# Patient Record
(demographics unavailable — no encounter records)

---

## 2024-12-01 NOTE — HISTORY OF PRESENT ILLNESS
[Neck] : neck [Lower back] : lower back [Gradual] : gradual [2] : 2 [3] : 3 [Dull/Aching] : dull/aching [Localized] : localized [Intermittent] : intermittent [Leisure] : leisure [Rest] : rest [Walking/activity] : walking/activity [Sitting] : sitting [Full time] : Work status: full time [de-identified] : 11/27/2024 - 46 Y M presenting for initial evaluation complaining of 2-3 months of tailbone pain without trauma or injury. Longer standing chronic neck pain for approximately 2 years, aggravated symptoms more recently regarding posterior neck pain. No radicular arm or leg pain. Takes otc NSAID on occasion for symptom relief. He is performing direct yoga exercises for low back pain. 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 No general medical health abnormalities are reported. He is working full time.  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

## 2024-12-01 NOTE — HISTORY OF PRESENT ILLNESS
[Neck] : neck [Lower back] : lower back [Gradual] : gradual [2] : 2 [3] : 3 [Dull/Aching] : dull/aching [Localized] : localized [Intermittent] : intermittent [Leisure] : leisure [Rest] : rest [Walking/activity] : walking/activity [Sitting] : sitting [Full time] : Work status: full time [de-identified] : 11/27/2024 - 46 Y M presenting for initial evaluation complaining of 2-3 months of tailbone pain without trauma or injury. Longer standing chronic neck pain for approximately 2 years, aggravated symptoms more recently regarding posterior neck pain. No radicular arm or leg pain. Takes otc NSAID on occasion for symptom relief. He is performing direct yoga exercises for low back pain. 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 No general medical health abnormalities are reported. He is working full time.  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

## 2024-12-01 NOTE — DISCUSSION/SUMMARY
[de-identified] : 46 Y M with coccydynia  XR today appears to be normal. Patient will initiate use with tailbone cushion and continue NSAID as needed during heightened severity of pain. Discussed we can always order an MRI if his symptoms remain unchanged in 3 months. Patient resides in [place] in the wintertime and will follow up in the spring if pain is unimproved.   Prior to appointment and during encounter with patient extensive medical records were reviewed including but not limited to, hospital records, outpatient records, imaging results, and lab data. During this appointment the patient was examined, diagnoses were discussed and explained in a face to face manner. In addition extensive time was spent reviewing aforementioned diagnostic studies. Counseling including abnormal image results, differential diagnoses, treatment options, risk and benefits, lifestyle changes, current condition, and current medications was performed. Patient's comments, questions, and concerns were addressed and patient verbalized understanding. Based on this patient's presentation at our office, which is an orthopedic spine surgeon's office, this patient inherently / intrinsically has a risk, however minute, of developing issues such as Cauda equina syndrome, bowel and bladder changes, or progression of motor or neurological deficits such as paralysis which may be permanent.  SUZANNE MURRAY Acting as a Scribe for Dr. Sudeep OBREGON, Suzanne Murray, attest that this documentation has been prepared under the direction and in the presence of Provider Lj Sheets MD.

## 2024-12-01 NOTE — PHYSICAL EXAM
[Normal Coordination] : normal coordination [Normal DTR UE/LE] : normal DTR UE/LE  [Normal Sensation] : normal sensation [Normal Mood and Affect] : normal mood and affect [Oriented] : oriented [Able to Communicate] : able to communicate [Normal Skin] : normal skin [No Rash] : no rash [No Ulcers] : no ulcers [No Lesions] : no lesions [No obvious lymphadenopathy in areas examined] : no obvious lymphadenopathy in areas examined [Well Developed] : well developed [Peripheral vascular exam is grossly normal] : peripheral vascular exam is grossly normal [No Respiratory Distress] : no respiratory distress [Lungs clear to auscultation bilaterally] : lungs clear to auscultation bilaterally [Normal Bowel Sounds] : normal bowel sounds [Non-Tender] : non-tender [No HSM] : no HSM [No Mass] : no mass [NL (90)] : forward flexion 90 degrees [NL (30)] : right lateral rotation 30 degrees [NL (45)] : extension 45 degrees [NL (40)] : right lateral bending 40 degrees [5___] : right extensor hallicus longus 5[unfilled]/5 [] : clonus not sustained at ankle

## 2024-12-01 NOTE — DISCUSSION/SUMMARY
[de-identified] : 46 Y M with coccydynia  XR today appears to be normal. Patient will initiate use with tailbone cushion and continue NSAID as needed during heightened severity of pain. Discussed we can always order an MRI if his symptoms remain unchanged in 3 months. Patient resides in [place] in the wintertime and will follow up in the spring if pain is unimproved.   Prior to appointment and during encounter with patient extensive medical records were reviewed including but not limited to, hospital records, outpatient records, imaging results, and lab data. During this appointment the patient was examined, diagnoses were discussed and explained in a face to face manner. In addition extensive time was spent reviewing aforementioned diagnostic studies. Counseling including abnormal image results, differential diagnoses, treatment options, risk and benefits, lifestyle changes, current condition, and current medications was performed. Patient's comments, questions, and concerns were addressed and patient verbalized understanding. Based on this patient's presentation at our office, which is an orthopedic spine surgeon's office, this patient inherently / intrinsically has a risk, however minute, of developing issues such as Cauda equina syndrome, bowel and bladder changes, or progression of motor or neurological deficits such as paralysis which may be permanent.  SUZANNE MURRAY Acting as a Scribe for Dr. Sudeep OBREGON, Suzanne Murray, attest that this documentation has been prepared under the direction and in the presence of Provider Lj Sheets MD.

## 2024-12-01 NOTE — DATA REVIEWED
[FreeTextEntry1] : On my interpretation of these images in office x-rays sacrum/coccyx 11/27/2024  No fracture, cyst, tumor. Normal looking XR  Lumbar X-ray ap/lat good maintenance of lordosis and disc heights.   I stop paperwork reviewed

## 2025-05-25 NOTE — HISTORY OF PRESENT ILLNESS
[2] : 2 [Full time] : Work status: full time [de-identified] : 05/23/2025 - Patient presenting to review MRI. Continues to complain of low back and tailbone pain. Similar compared to November visit. No radic buttock or leg pains. He is active with yoga & surfing. Pain worse with extended.   11/27/2024 - 46 Y M presenting for initial evaluation complaining of 2-3 months of tailbone pain without trauma or injury. Longer standing chronic neck pain for approximately 2 years, aggravated symptoms more recently regarding posterior neck pain. No radicular arm or leg pain. Takes otc NSAID on occasion for symptom relief. He is performing direct yoga exercises for low back pain. 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 No general medical health abnormalities are reported. He is working full time.  [de-identified] : no

## 2025-05-25 NOTE — HISTORY OF PRESENT ILLNESS
[2] : 2 [Full time] : Work status: full time [de-identified] : 05/23/2025 - Patient presenting to review MRI. Continues to complain of low back and tailbone pain. Similar compared to November visit. No radic buttock or leg pains. He is active with yoga & surfing. Pain worse with extended.   11/27/2024 - 46 Y M presenting for initial evaluation complaining of 2-3 months of tailbone pain without trauma or injury. Longer standing chronic neck pain for approximately 2 years, aggravated symptoms more recently regarding posterior neck pain. No radicular arm or leg pain. Takes otc NSAID on occasion for symptom relief. He is performing direct yoga exercises for low back pain. 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 No general medical health abnormalities are reported. He is working full time.  [de-identified] : no

## 2025-05-25 NOTE — PHYSICAL EXAM
[Normal Coordination] : normal coordination [Normal DTR UE/LE] : normal DTR UE/LE  [Normal Sensation] : normal sensation [Normal Mood and Affect] : normal mood and affect [Oriented] : oriented [Able to Communicate] : able to communicate [Normal Skin] : normal skin [No Rash] : no rash [No Ulcers] : no ulcers [No Lesions] : no lesions [No obvious lymphadenopathy in areas examined] : no obvious lymphadenopathy in areas examined [Well Developed] : well developed [Peripheral vascular exam is grossly normal] : peripheral vascular exam is grossly normal [No Respiratory Distress] : no respiratory distress [NL (90)] : forward flexion 90 degrees [NL (30)] : right lateral rotation 30 degrees [NL (45)] : extension 45 degrees [NL (40)] : right lateral bending 40 degrees [5___] : right extensor hallicus longus 5[unfilled]/5 [Bilateral] : hip bilaterally [] : non-antalgic

## 2025-05-25 NOTE — DATA REVIEWED
[FreeTextEntry1] : On my interpretation of these images & reports lumbar spnie mri madhu 5/16/25 L4-L5: Disc bulge. Right paracentral annular fissure with disc protrusion. Mild facet arthrosis. Mild canal narrowing. L5-S1: Disc bulge. Right paracentral annular fissure with disc protrusion. Mild bilateral facet arthrosis. Moderate right and mild left lateral recess narrowing with disc material contacting descending S1 nerve roots right-greater-than-left  On my interpretation of these images & reports sacrum coccyx mri 5/16/25 Unremarkable MRI of the sacrum and coccyx.  On my interpretation of these images in office x-rays sacrum/coccyx 11/27/2024  No fracture, cyst, tumor. Normal looking XR  Lumbar X-ray ap/lat good maintenance of lordosis and disc heights.   I stop paperwork reviewed

## 2025-05-25 NOTE — DISCUSSION/SUMMARY
[de-identified] : 46 Y M with chronic low back & atraumatic coccydynia  Mris discussed & reviewed - L4-L5: Disc bulge. Right paracentral annular fissure with disc protrusion. Mild facet arthrosis. Mild canal narrowing. L5-S1: Disc bulge. Right paracentral annular fissure with disc protrusion. Mild bilateral facet arthrosis. Moderate right and mild left lateral recess narrowing with disc material contacting descending S1 nerve roots right-greater-than-left. Unremarkable MRI of the sacrum and coccyx.  Patient will continue tailbone cushion and continue NSAID as needed during heightened severity of pain. Emphasized pelvic floor stretching & strengthening exercises. Referral provided for physical therapy with focus on these exercises. If refractory, discussed injection based options. F/u 6 WKS   Prior to appointment and during encounter with patient extensive medical records were reviewed including but not limited to, hospital records, outpatient records, imaging results, and lab data. During this appointment the patient was examined, diagnoses were discussed and explained in a face to face manner. In addition extensive time was spent reviewing aforementioned diagnostic studies. Counseling including abnormal image results, differential diagnoses, treatment options, risk and benefits, lifestyle changes, current condition, and current medications was performed. Patient's comments, questions, and concerns were addressed and patient verbalized understanding. Based on this patient's presentation at our office, which is an orthopedic spine surgeon's office, this patient inherently / intrinsically has a risk, however minute, of developing issues such as Cauda equina syndrome, bowel and bladder changes, or progression of motor or neurological deficits such as paralysis which may be permanent.   SUZANNE MURRAY Acting as a Scribe for Dr. Sudeep OBREGON, Suzanne Murray, attest that this documentation has been prepared under the direction and in the presence of Provider Lj Sheets MD.

## 2025-05-25 NOTE — DISCUSSION/SUMMARY
[de-identified] : 46 Y M with chronic low back & atraumatic coccydynia  Mris discussed & reviewed - L4-L5: Disc bulge. Right paracentral annular fissure with disc protrusion. Mild facet arthrosis. Mild canal narrowing. L5-S1: Disc bulge. Right paracentral annular fissure with disc protrusion. Mild bilateral facet arthrosis. Moderate right and mild left lateral recess narrowing with disc material contacting descending S1 nerve roots right-greater-than-left. Unremarkable MRI of the sacrum and coccyx.  Patient will continue tailbone cushion and continue NSAID as needed during heightened severity of pain. Emphasized pelvic floor stretching & strengthening exercises. Referral provided for physical therapy with focus on these exercises. If refractory, discussed injection based options. F/u 6 WKS   Prior to appointment and during encounter with patient extensive medical records were reviewed including but not limited to, hospital records, outpatient records, imaging results, and lab data. During this appointment the patient was examined, diagnoses were discussed and explained in a face to face manner. In addition extensive time was spent reviewing aforementioned diagnostic studies. Counseling including abnormal image results, differential diagnoses, treatment options, risk and benefits, lifestyle changes, current condition, and current medications was performed. Patient's comments, questions, and concerns were addressed and patient verbalized understanding. Based on this patient's presentation at our office, which is an orthopedic spine surgeon's office, this patient inherently / intrinsically has a risk, however minute, of developing issues such as Cauda equina syndrome, bowel and bladder changes, or progression of motor or neurological deficits such as paralysis which may be permanent.   SUZANNE MURRAY Acting as a Scribe for Dr. Sudeep OBREGON, Suzanne Murray, attest that this documentation has been prepared under the direction and in the presence of Provider Lj Sheets MD.